# Patient Record
Sex: FEMALE | URBAN - METROPOLITAN AREA
[De-identification: names, ages, dates, MRNs, and addresses within clinical notes are randomized per-mention and may not be internally consistent; named-entity substitution may affect disease eponyms.]

---

## 2023-04-07 ENCOUNTER — APPOINTMENT (RX ONLY)
Dept: URBAN - METROPOLITAN AREA CLINIC 307 | Facility: CLINIC | Age: 28
Setting detail: DERMATOLOGY
End: 2023-04-07

## 2023-04-07 DIAGNOSIS — L81.4 OTHER MELANIN HYPERPIGMENTATION: ICD-10-CM | Status: INADEQUATELY CONTROLLED

## 2023-04-07 PROCEDURE — ? ADDITIONAL NOTES

## 2023-04-07 PROCEDURE — ? COUNSELING

## 2023-04-07 PROCEDURE — 99203 OFFICE O/P NEW LOW 30 MIN: CPT

## 2023-04-07 PROCEDURE — ? PRESCRIPTION

## 2023-04-07 RX ORDER — AZELAIC ACID 0.15 G/G
AEROSOL, FOAM TOPICAL
Qty: 50 | Refills: 6 | Status: ERX | COMMUNITY
Start: 2023-04-07

## 2023-04-07 RX ADMIN — AZELAIC ACID: 0.15 AEROSOL, FOAM TOPICAL at 00:00

## 2023-04-07 ASSESSMENT — LOCATION DETAILED DESCRIPTION DERM: LOCATION DETAILED: LEFT PROXIMAL PRETIBIAL REGION

## 2023-04-07 ASSESSMENT — LOCATION SIMPLE DESCRIPTION DERM: LOCATION SIMPLE: LEFT PRETIBIAL REGION

## 2023-04-07 ASSESSMENT — LOCATION ZONE DERM: LOCATION ZONE: LEG

## 2023-04-07 NOTE — PROCEDURE: ADDITIONAL NOTES
Additional Notes: Patient will use The Ordinary azelaic acid BID if Finacea is too expensive\\nShe will spot treat with Differin and d/c if area gets inflamed or irritated\\nEmphasized importance of sun protection
Render Risk Assessment In Note?: no
Detail Level: Simple